# Patient Record
Sex: FEMALE
[De-identification: names, ages, dates, MRNs, and addresses within clinical notes are randomized per-mention and may not be internally consistent; named-entity substitution may affect disease eponyms.]

---

## 2017-09-18 ENCOUNTER — RESULT REVIEW (OUTPATIENT)
Age: 47
End: 2017-09-18

## 2018-08-23 ENCOUNTER — TRANSCRIPTION ENCOUNTER (OUTPATIENT)
Age: 48
End: 2018-08-23

## 2018-10-08 ENCOUNTER — APPOINTMENT (OUTPATIENT)
Dept: PEDIATRIC ALLERGY IMMUNOLOGY | Facility: CLINIC | Age: 48
End: 2018-10-08

## 2018-12-07 ENCOUNTER — APPOINTMENT (OUTPATIENT)
Dept: INTERNAL MEDICINE | Facility: CLINIC | Age: 48
End: 2018-12-07
Payer: COMMERCIAL

## 2018-12-07 VITALS
DIASTOLIC BLOOD PRESSURE: 60 MMHG | BODY MASS INDEX: 19.5 KG/M2 | OXYGEN SATURATION: 99 % | TEMPERATURE: 97.7 F | SYSTOLIC BLOOD PRESSURE: 94 MMHG | HEIGHT: 63 IN | WEIGHT: 110.03 LBS | HEART RATE: 65 BPM

## 2018-12-07 DIAGNOSIS — Z80.7 FAMILY HISTORY OF OTHER MALIGNANT NEOPLASMS OF LYMPHOID, HEMATOPOIETIC AND RELATED TISSUES: ICD-10-CM

## 2018-12-07 DIAGNOSIS — Z87.11 PERSONAL HISTORY OF PEPTIC ULCER DISEASE: ICD-10-CM

## 2018-12-07 DIAGNOSIS — M25.561 PAIN IN RIGHT KNEE: ICD-10-CM

## 2018-12-07 DIAGNOSIS — Z80.3 FAMILY HISTORY OF MALIGNANT NEOPLASM OF BREAST: ICD-10-CM

## 2018-12-07 PROCEDURE — 99203 OFFICE O/P NEW LOW 30 MIN: CPT | Mod: 25

## 2018-12-07 PROCEDURE — 36415 COLL VENOUS BLD VENIPUNCTURE: CPT

## 2018-12-07 NOTE — HISTORY OF PRESENT ILLNESS
[FreeTextEntry1] : cpe [de-identified] :  here for initial visit. lives with . Son lives in [place] age 22 works in [employer]. \par very active does wt training, gym daily. wears glasses\par sees dentist. \par  feet and joints doing fine.\par + menopausal sx. hot flashes. tried otc medication not helpful now stopped. sees gyn annually. utd mammo, colo.\par remote hx peptic ulcer. had colo nl 2011. declines vaccines. \par

## 2018-12-07 NOTE — REVIEW OF SYSTEMS
[Fever] : no fever [Vision Problems] : no vision problems [Hearing Loss] : no hearing loss [Chest Pain] : no chest pain [Lower Ext Edema] : no lower extremity edema [Shortness Of Breath] : no shortness of breath [Dyspnea on Exertion] : no dyspnea on exertion [Abdominal Pain] : no abdominal pain [Dysuria] : no dysuria [Joint Pain] : no joint pain [Skin Rash] : no skin rash [Headache] : no headache [Dizziness] : no dizziness [Depression] : no depression

## 2018-12-07 NOTE — PHYSICAL EXAM
[No Acute Distress] : no acute distress [Well Nourished] : well nourished [Normal Sclera/Conjunctiva] : normal sclera/conjunctiva [PERRL] : pupils equal round and reactive to light [EOMI] : extraocular movements intact [Normal Outer Ear/Nose] : the outer ears and nose were normal in appearance [Normal Oropharynx] : the oropharynx was normal [No JVD] : no jugular venous distention [Supple] : supple [No Lymphadenopathy] : no lymphadenopathy [Thyroid Normal, No Nodules] : the thyroid was normal and there were no nodules present [No Respiratory Distress] : no respiratory distress  [No Accessory Muscle Use] : no accessory muscle use [Normal Rate] : normal rate  [Regular Rhythm] : with a regular rhythm [Normal S1, S2] : normal S1 and S2 [No Murmur] : no murmur heard [No Edema] : there was no peripheral edema [Declined Breast Exam] : declined breast exam  [Soft] : abdomen soft [Non Tender] : non-tender [No HSM] : no HSM [Normal Bowel Sounds] : normal bowel sounds [Normal Posterior Cervical Nodes] : no posterior cervical lymphadenopathy [Normal Anterior Cervical Nodes] : no anterior cervical lymphadenopathy [No CVA Tenderness] : no CVA  tenderness [No Spinal Tenderness] : no spinal tenderness [No Joint Swelling] : no joint swelling [No Rash] : no rash [Normal Gait] : normal gait [Coordination Grossly Intact] : coordination grossly intact [No Focal Deficits] : no focal deficits [Normal Affect] : the affect was normal [Normal Mood] : the mood was normal [Normal Insight/Judgement] : insight and judgment were intact

## 2018-12-07 NOTE — ASSESSMENT
[FreeTextEntry1] : Menopausal sx - hot flashes only. given anxiety as trigger discussed relaxation, cbt. discused med options, pt declined. call if any worsening sx. \par HM utd keanu, gyn, colo. declined vaccines. utd ophtho, dental. mood ok. check bloodwork. fu as needed.

## 2018-12-10 LAB
BASOPHILS # BLD AUTO: 0.03 K/UL
BASOPHILS NFR BLD AUTO: 0.6 %
CHOLEST SERPL-MCNC: 163 MG/DL
CHOLEST/HDLC SERPL: 2.8 RATIO
EOSINOPHIL # BLD AUTO: 0.09 K/UL
EOSINOPHIL NFR BLD AUTO: 1.8 %
HCT VFR BLD CALC: 41.1 %
HDLC SERPL-MCNC: 58 MG/DL
HGB BLD-MCNC: 13.6 G/DL
IMM GRANULOCYTES NFR BLD AUTO: 0.4 %
LDLC SERPL CALC-MCNC: 80 MG/DL
LYMPHOCYTES # BLD AUTO: 0.86 K/UL
LYMPHOCYTES NFR BLD AUTO: 17 %
MAN DIFF?: NORMAL
MCHC RBC-ENTMCNC: 28.9 PG
MCHC RBC-ENTMCNC: 33.1 GM/DL
MCV RBC AUTO: 87.3 FL
MONOCYTES # BLD AUTO: 0.31 K/UL
MONOCYTES NFR BLD AUTO: 6.1 %
NEUTROPHILS # BLD AUTO: 3.76 K/UL
NEUTROPHILS NFR BLD AUTO: 74.1 %
PLATELET # BLD AUTO: 252 K/UL
RBC # BLD: 4.71 M/UL
RBC # FLD: 12.9 %
TRIGL SERPL-MCNC: 126 MG/DL
TSH SERPL-ACNC: 2.47 UIU/ML
VIT B12 SERPL-MCNC: 459 PG/ML
WBC # FLD AUTO: 5.07 K/UL

## 2019-03-16 ENCOUNTER — TRANSCRIPTION ENCOUNTER (OUTPATIENT)
Age: 49
End: 2019-03-16

## 2019-12-09 ENCOUNTER — APPOINTMENT (OUTPATIENT)
Dept: INTERNAL MEDICINE | Facility: CLINIC | Age: 49
End: 2019-12-09
Payer: COMMERCIAL

## 2019-12-09 VITALS
TEMPERATURE: 97.6 F | DIASTOLIC BLOOD PRESSURE: 60 MMHG | BODY MASS INDEX: 19.84 KG/M2 | HEIGHT: 63 IN | SYSTOLIC BLOOD PRESSURE: 94 MMHG | WEIGHT: 112 LBS | HEART RATE: 53 BPM | OXYGEN SATURATION: 98 %

## 2019-12-09 PROCEDURE — 99396 PREV VISIT EST AGE 40-64: CPT | Mod: 25

## 2019-12-09 PROCEDURE — 36415 COLL VENOUS BLD VENIPUNCTURE: CPT

## 2019-12-09 RX ORDER — GLUC/MSM/COLGN2/HYAL/ANTIARTH3 375-375-20
TABLET ORAL
Refills: 0 | Status: DISCONTINUED | COMMUNITY
End: 2019-12-09

## 2019-12-09 RX ORDER — CHROMIUM 200 MCG
TABLET ORAL
Refills: 0 | Status: DISCONTINUED | COMMUNITY
End: 2019-12-09

## 2019-12-09 NOTE — PHYSICAL EXAM
[No Acute Distress] : no acute distress [Well Developed] : well developed [Well Nourished] : well nourished [Well-Appearing] : well-appearing [PERRL] : pupils equal round and reactive to light [Normal Sclera/Conjunctiva] : normal sclera/conjunctiva [EOMI] : extraocular movements intact [Normal Outer Ear/Nose] : the outer ears and nose were normal in appearance [Normal Oropharynx] : the oropharynx was normal [No JVD] : no jugular venous distention [No Lymphadenopathy] : no lymphadenopathy [Supple] : supple [Thyroid Normal, No Nodules] : the thyroid was normal and there were no nodules present [No Respiratory Distress] : no respiratory distress  [No Accessory Muscle Use] : no accessory muscle use [Normal Rate] : normal rate  [Clear to Auscultation] : lungs were clear to auscultation bilaterally [Regular Rhythm] : with a regular rhythm [Normal S1, S2] : normal S1 and S2 [No Carotid Bruits] : no carotid bruits [No Murmur] : no murmur heard [No Abdominal Bruit] : a ~M bruit was not heard ~T in the abdomen [No Varicosities] : no varicosities [Pedal Pulses Present] : the pedal pulses are present [No Edema] : there was no peripheral edema [No Palpable Aorta] : no palpable aorta [No Extremity Clubbing/Cyanosis] : no extremity clubbing/cyanosis [Soft] : abdomen soft [Non-distended] : non-distended [Non Tender] : non-tender [No Masses] : no abdominal mass palpated [No HSM] : no HSM [Normal Bowel Sounds] : normal bowel sounds [Normal Supraclavicular Nodes] : no supraclavicular lymphadenopathy [Normal Posterior Cervical Nodes] : no posterior cervical lymphadenopathy [Normal Anterior Cervical Nodes] : no anterior cervical lymphadenopathy [No CVA Tenderness] : no CVA  tenderness [No Spinal Tenderness] : no spinal tenderness [No Joint Swelling] : no joint swelling [Grossly Normal Strength/Tone] : grossly normal strength/tone [No Rash] : no rash [Coordination Grossly Intact] : coordination grossly intact [No Focal Deficits] : no focal deficits [Normal Gait] : normal gait [Deep Tendon Reflexes (DTR)] : deep tendon reflexes were 2+ and symmetric [Normal Affect] : the affect was normal [Normal Mood] : the mood was normal [Normal Insight/Judgement] : insight and judgment were intact [de-identified] : papule on R tragus

## 2019-12-09 NOTE — ASSESSMENT
[FreeTextEntry1] : anxiety discussed options pt sees thearpist declined medications will call if worsening sx \par cont exercise, healthy diet \par sees dentist. sees eye doctor. utd colo, mammo, pap. \par menopausal sx improved, sees gyn annually. \par remote hx peptic ulcer no gerd sx since then. \par had colo nl 2011. declines vaccines. \par labs today\par fu 1y, sooner if any new sx. \par ear papule likely comedone if not resolving pt will fu derm.

## 2019-12-09 NOTE — HISTORY OF PRESENT ILLNESS
[de-identified] :  here for cpe lives with . Son lives in [place] age 22 works in fashion she does not speak to him. this is a source of stress and anxiety for her. impacts sleep. does not interfere with work or activities. no si hi. \par very active does wt training, gym daily no cp sob edema. wears glasses\par sees dentist. sees eye doctor. utd colo, mammo, pap. healthy vegetarian diet. \par  feet and joints doing fine.\par menopausal sx improved, sees gyn annually. \par remote hx peptic ulcer no gerd sx since then. had colo nl 2011. declines vaccines. \par 10 point review of systems reviewed and negative except as above  [FreeTextEntry1] : cpe

## 2019-12-10 LAB
ALBUMIN SERPL ELPH-MCNC: 4.6 G/DL
ALP BLD-CCNC: 61 U/L
ALT SERPL-CCNC: 11 U/L
ANION GAP SERPL CALC-SCNC: 14 MMOL/L
AST SERPL-CCNC: 15 U/L
BASOPHILS # BLD AUTO: 0.06 K/UL
BASOPHILS NFR BLD AUTO: 1 %
BILIRUB SERPL-MCNC: 0.4 MG/DL
BUN SERPL-MCNC: 13 MG/DL
CALCIUM SERPL-MCNC: 9.9 MG/DL
CHLORIDE SERPL-SCNC: 104 MMOL/L
CO2 SERPL-SCNC: 24 MMOL/L
CREAT SERPL-MCNC: 0.71 MG/DL
EOSINOPHIL # BLD AUTO: 0.11 K/UL
EOSINOPHIL NFR BLD AUTO: 1.8 %
GLUCOSE SERPL-MCNC: 86 MG/DL
HCT VFR BLD CALC: 43.7 %
HGB BLD-MCNC: 14.2 G/DL
IMM GRANULOCYTES NFR BLD AUTO: 0.5 %
LYMPHOCYTES # BLD AUTO: 1.03 K/UL
LYMPHOCYTES NFR BLD AUTO: 16.9 %
MAN DIFF?: NORMAL
MCHC RBC-ENTMCNC: 28.1 PG
MCHC RBC-ENTMCNC: 32.5 GM/DL
MCV RBC AUTO: 86.5 FL
MONOCYTES # BLD AUTO: 0.61 K/UL
MONOCYTES NFR BLD AUTO: 10 %
NEUTROPHILS # BLD AUTO: 4.26 K/UL
NEUTROPHILS NFR BLD AUTO: 69.8 %
PLATELET # BLD AUTO: 215 K/UL
POTASSIUM SERPL-SCNC: 4.5 MMOL/L
PROT SERPL-MCNC: 7.4 G/DL
RBC # BLD: 5.05 M/UL
RBC # FLD: 12.6 %
SODIUM SERPL-SCNC: 141 MMOL/L
WBC # FLD AUTO: 6.1 K/UL

## 2020-10-05 ENCOUNTER — RESULT REVIEW (OUTPATIENT)
Age: 50
End: 2020-10-05

## 2021-02-05 ENCOUNTER — APPOINTMENT (OUTPATIENT)
Dept: INTERNAL MEDICINE | Facility: CLINIC | Age: 51
End: 2021-02-05
Payer: COMMERCIAL

## 2021-02-05 VITALS
BODY MASS INDEX: 20.02 KG/M2 | OXYGEN SATURATION: 98 % | DIASTOLIC BLOOD PRESSURE: 60 MMHG | TEMPERATURE: 97.8 F | HEIGHT: 63 IN | WEIGHT: 113 LBS | SYSTOLIC BLOOD PRESSURE: 100 MMHG | HEART RATE: 68 BPM

## 2021-02-05 DIAGNOSIS — Z23 ENCOUNTER FOR IMMUNIZATION: ICD-10-CM

## 2021-02-05 PROCEDURE — 99072 ADDL SUPL MATRL&STAF TM PHE: CPT

## 2021-02-05 PROCEDURE — 99396 PREV VISIT EST AGE 40-64: CPT | Mod: 25

## 2021-02-05 NOTE — PHYSICAL EXAM
[No Acute Distress] : no acute distress [Well Nourished] : well nourished [Well Developed] : well developed [Well-Appearing] : well-appearing [Normal Sclera/Conjunctiva] : normal sclera/conjunctiva [PERRL] : pupils equal round and reactive to light [Normal Outer Ear/Nose] : the outer ears and nose were normal in appearance [EOMI] : extraocular movements intact [Normal Oropharynx] : the oropharynx was normal [No JVD] : no jugular venous distention [No Lymphadenopathy] : no lymphadenopathy [Supple] : supple [Thyroid Normal, No Nodules] : the thyroid was normal and there were no nodules present [No Respiratory Distress] : no respiratory distress  [No Accessory Muscle Use] : no accessory muscle use [Clear to Auscultation] : lungs were clear to auscultation bilaterally [Normal Rate] : normal rate  [Regular Rhythm] : with a regular rhythm [Normal S1, S2] : normal S1 and S2 [No Murmur] : no murmur heard [No Carotid Bruits] : no carotid bruits [No Abdominal Bruit] : a ~M bruit was not heard ~T in the abdomen [No Varicosities] : no varicosities [Pedal Pulses Present] : the pedal pulses are present [No Edema] : there was no peripheral edema [No Palpable Aorta] : no palpable aorta [No Extremity Clubbing/Cyanosis] : no extremity clubbing/cyanosis [Declined Breast Exam] : declined breast exam  [Soft] : abdomen soft [Non Tender] : non-tender [Non-distended] : non-distended [No Masses] : no abdominal mass palpated [No HSM] : no HSM [Normal Bowel Sounds] : normal bowel sounds [Normal Posterior Cervical Nodes] : no posterior cervical lymphadenopathy [Normal Anterior Cervical Nodes] : no anterior cervical lymphadenopathy [No CVA Tenderness] : no CVA  tenderness [No Spinal Tenderness] : no spinal tenderness [No Joint Swelling] : no joint swelling [Grossly Normal Strength/Tone] : grossly normal strength/tone [No Rash] : no rash [Coordination Grossly Intact] : coordination grossly intact [No Focal Deficits] : no focal deficits [Normal Gait] : normal gait [Deep Tendon Reflexes (DTR)] : deep tendon reflexes were 2+ and symmetric [Normal Affect] : the affect was normal [Normal Insight/Judgement] : insight and judgment were intact

## 2021-02-05 NOTE — HISTORY OF PRESENT ILLNESS
[de-identified] :  here for cpe lives with . moods doing well. \par very active does walks daily 2mi no cp sob edema. wears glasses\par sees dentist. sees eye doctor. utd colo, mammo, pap. healthy vegetarian diet. \par menopausal sx improved, sees gyn annually. \par remote hx peptic ulcer no gerd sx since then. had colo nl 2011 due for f/u this year. \par 10 point review of systems reviewed and negative except as above

## 2021-02-05 NOTE — ASSESSMENT
[FreeTextEntry1] :  moods doing well\par discussed exercise\par rec colonoscopy\par utd mammo, pap. cont healthy diet, utd drem, dental. \par remote hx peptic ulcer due for gi fu which she declined will do fobi and return to gi next year.

## 2021-02-07 LAB
25(OH)D3 SERPL-MCNC: 34.4 NG/ML
ALBUMIN SERPL ELPH-MCNC: 4.9 G/DL
ALP BLD-CCNC: 69 U/L
ALT SERPL-CCNC: 12 U/L
ANION GAP SERPL CALC-SCNC: 20 MMOL/L
AST SERPL-CCNC: 20 U/L
BASOPHILS # BLD AUTO: 0.05 K/UL
BASOPHILS NFR BLD AUTO: 0.8 %
BILIRUB SERPL-MCNC: 0.3 MG/DL
BUN SERPL-MCNC: 16 MG/DL
CALCIUM SERPL-MCNC: 10 MG/DL
CHLORIDE SERPL-SCNC: 104 MMOL/L
CHOLEST SERPL-MCNC: 168 MG/DL
CO2 SERPL-SCNC: 19 MMOL/L
CREAT SERPL-MCNC: 0.82 MG/DL
EOSINOPHIL # BLD AUTO: 0.06 K/UL
EOSINOPHIL NFR BLD AUTO: 1 %
GLUCOSE SERPL-MCNC: 95 MG/DL
HCT VFR BLD CALC: 42.4 %
HDLC SERPL-MCNC: 55 MG/DL
HGB BLD-MCNC: 14.3 G/DL
IMM GRANULOCYTES NFR BLD AUTO: 0.2 %
LDLC SERPL CALC-MCNC: 86 MG/DL
LYMPHOCYTES # BLD AUTO: 0.78 K/UL
LYMPHOCYTES NFR BLD AUTO: 12.8 %
MAN DIFF?: NORMAL
MCHC RBC-ENTMCNC: 29.4 PG
MCHC RBC-ENTMCNC: 33.7 GM/DL
MCV RBC AUTO: 87.1 FL
MONOCYTES # BLD AUTO: 0.48 K/UL
MONOCYTES NFR BLD AUTO: 7.9 %
NEUTROPHILS # BLD AUTO: 4.72 K/UL
NEUTROPHILS NFR BLD AUTO: 77.3 %
NONHDLC SERPL-MCNC: 113 MG/DL
PLATELET # BLD AUTO: 232 K/UL
POTASSIUM SERPL-SCNC: 5 MMOL/L
PROT SERPL-MCNC: 7.7 G/DL
RBC # BLD: 4.87 M/UL
RBC # FLD: 12.6 %
SODIUM SERPL-SCNC: 143 MMOL/L
TRIGL SERPL-MCNC: 136 MG/DL
TSH SERPL-ACNC: 3.47 UIU/ML
VIT B12 SERPL-MCNC: 397 PG/ML
WBC # FLD AUTO: 6.1 K/UL

## 2021-02-16 ENCOUNTER — NON-APPOINTMENT (OUTPATIENT)
Age: 51
End: 2021-02-16

## 2021-02-16 LAB — HEMOCCULT STL QL IA: NEGATIVE

## 2021-10-17 ENCOUNTER — NON-APPOINTMENT (OUTPATIENT)
Age: 51
End: 2021-10-17

## 2021-10-21 ENCOUNTER — APPOINTMENT (OUTPATIENT)
Dept: ORTHOPEDIC SURGERY | Facility: CLINIC | Age: 51
End: 2021-10-21
Payer: COMMERCIAL

## 2021-10-21 VITALS — WEIGHT: 120 LBS | BODY MASS INDEX: 21.26 KG/M2 | HEIGHT: 63 IN

## 2021-10-21 VITALS — SYSTOLIC BLOOD PRESSURE: 100 MMHG | DIASTOLIC BLOOD PRESSURE: 69 MMHG | HEART RATE: 71 BPM

## 2021-10-21 PROCEDURE — 73564 X-RAY EXAM KNEE 4 OR MORE: CPT | Mod: RT

## 2021-10-21 PROCEDURE — 99203 OFFICE O/P NEW LOW 30 MIN: CPT

## 2021-10-26 NOTE — HISTORY OF PRESENT ILLNESS
[de-identified] : 50 year old female with prior history of right knee pain presents today with right knee swelling x 1 week. Notices swelling after playing tennis. She is not taking pain medication. Denies catching, locking, numbness or tingling. She has had similar pain in 2016 and saw Dr. Mcgowan and was treated conservatively and the pain resolved. \par \par The patient's past medical history, past surgical history, medications and allergies were reviewed by me today with the patient and documented accordingly. In addition, the patient's family and social history, which were noncontributory to this visit, were reviewed also.

## 2021-10-26 NOTE — PHYSICAL EXAM
[de-identified] : Oriented to time, place, person\par Mood: Normal\par Affect: Normal\par Appearance: Healthy, well appearing, no acute distress.\par Gait: Normal\par Assistive Devices: None\par \par Right Knee Exam:\par \par Skin: Clean, dry, intact\par Inspection: No obvious malalignment, no masses, no swelling, mild effusion\par Pulses: 2+ DP/PT pulses \par ROM: 0-135 degrees of flexion. No pain with deep knee flexion/extension.\par Tenderness: No MJLT. +LJLT. No pain over the patella facets. No pain to the quadriceps tendon. No pain to the patella tendon. No posterior knee tenderness.\par Stability: Stable to varus, valgus. Negative Lachman testing. Negative anterior drawer, negative posterior drawer.\par Strength: 5/5 Q/H/TA/GS/EHL, without atrophy\par Neuro: Intact to light touch throughout, DTRs normal\par Additional Tests: Negative Devin's test, Negative patellar grind test  [de-identified] : The following radiographs were ordered and read by me during this patients visit. I reviewed each radiograph in detail with the patient and discussed the findings as highlighted below. \par \par 4 views of the right knee were obtained today, 10/21/2021, that show no acute fracture or dislocation. There is no medial, no lateral and mild patellofemoral degenerative changes seen. There is no significant malalignment. No significant other obvious osseous abnormality, otherwise unremarkable.  [facility]

## 2021-10-26 NOTE — ADDENDUM
[FreeTextEntry1] : This note was written by Lily Espinal on 10/21/2021 acting solely as a scribe for Dr. Jesus Sanders.\par \par All medical record entries made by the Scribe were at my, Dr. Jesus Sanders, direction and personally dictated by me on 10/21/2021. I have personally reviewed the chart and agree that the record accurately reflects my personal performance of the history, physical exam, assessment and plan.

## 2021-10-26 NOTE — DISCUSSION/SUMMARY
[de-identified] : 51 y/o female with right knee pain. \par \par Presentation is consistent with early degenerative change and arthrosis to the knee. The pain is focused on lateral joint line tenderness with possible meniscal pathology.  Described that this is likely due to overuse, and age-related "wear and tear". Pain may be related to breakdown of the chondral surfaces, cartilage, or ligaments of the knee. Patient is not looking for surgical intervention and does not want to get MRI at this time. \par \par Recommendation: Begin trial of PT, Rx given. Conservative care & observation; including rest/activity avoidance until less symptomatic with subsequent gradual return to full low impact activity as tolerated. Patient may also use OTC NSAIDs or acetaminophen as tolerated, with application of ice/heat to the area 2-3x daily for 20 minutes after periods of activity. \par \par Follow up as needed if pain persists for further evaluation and treatment.

## 2021-11-16 ENCOUNTER — NON-APPOINTMENT (OUTPATIENT)
Age: 51
End: 2021-11-16

## 2021-11-20 ENCOUNTER — APPOINTMENT (OUTPATIENT)
Dept: AFTER HOURS CARE | Facility: EMERGENCY ROOM | Age: 51
End: 2021-11-20
Payer: COMMERCIAL

## 2021-11-21 ENCOUNTER — TRANSCRIPTION ENCOUNTER (OUTPATIENT)
Age: 51
End: 2021-11-21

## 2021-11-21 PROCEDURE — 99203 OFFICE O/P NEW LOW 30 MIN: CPT | Mod: 95

## 2021-11-21 NOTE — PLAN
[No new medications perscribed] : Treat in place: No new medications prescribed [FreeTextEntry1] : pt can add tylenol to meds already taken\par pt referred back to her orthopedist to escalate her pain meds\par ED for uncontrolled pain

## 2021-11-21 NOTE — HISTORY OF PRESENT ILLNESS
[Home] : at home, [unfilled] , at the time of the visit. [Other Location: e.g. Home (Enter Location, City,State)___] : at [unfilled] [Verbal consent obtained from patient] : the patient, [unfilled] [FreeTextEntry8] : 50F recently had R meniscus injury (nonoperative for now) and started PT. Pt had 2nd PT 2d ago and shortly thereafter has had worsening R knee and low back pain. No motor/sensory changes, trouble urinating, LE swelling, rashes, fever. Pt was told to take Aleve and motrin by her orthopedist earlier tonight but has not had any relief. Pt requesting something more for her pain

## 2022-02-10 ENCOUNTER — NON-APPOINTMENT (OUTPATIENT)
Age: 52
End: 2022-02-10

## 2022-02-11 ENCOUNTER — APPOINTMENT (OUTPATIENT)
Dept: INTERNAL MEDICINE | Facility: CLINIC | Age: 52
End: 2022-02-11

## 2022-02-14 ENCOUNTER — APPOINTMENT (OUTPATIENT)
Dept: INTERNAL MEDICINE | Facility: CLINIC | Age: 52
End: 2022-02-14
Payer: COMMERCIAL

## 2022-02-14 VITALS
BODY MASS INDEX: 20.02 KG/M2 | OXYGEN SATURATION: 97 % | HEIGHT: 63 IN | TEMPERATURE: 97.7 F | SYSTOLIC BLOOD PRESSURE: 112 MMHG | HEART RATE: 57 BPM | DIASTOLIC BLOOD PRESSURE: 74 MMHG | WEIGHT: 113 LBS

## 2022-02-14 DIAGNOSIS — N95.1 MENOPAUSAL AND FEMALE CLIMACTERIC STATES: ICD-10-CM

## 2022-02-14 PROCEDURE — 99396 PREV VISIT EST AGE 40-64: CPT | Mod: 25

## 2022-02-14 NOTE — HISTORY OF PRESENT ILLNESS
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 moods doing well. \par very active does walks daily 2mi no cp sob edema. wears glasses\par meniscal tear and sciatica did pt which helped. \par sees dentist. sees eye doctor. utd fobi, mammo/sono, pap. healthy vegetarian diet. dr wood bone density due this or next year. osteopenia doing wbe and ca/d. \par menopausal sx improved, sees gyn annually. \par remote gerd no ulcer no sx since then. had colo nl 2011 fobi 2021 nl would prefer to do this year again. \par

## 2022-02-14 NOTE — ASSESSMENT
[FreeTextEntry1] : cont exercise.\par joint injuries recovered \par utd derm ophtho dental \par utd covid vaccine declines other vaccines\par  osteopenia doing wbe and ca/d. rpt bmd this or next year\par menopausal sx improved, sees gyn annually. utd pap\par had colo nl 2011 fobi 2021 nl would prefer to do this year again.

## 2022-02-15 LAB
25(OH)D3 SERPL-MCNC: 34.3 NG/ML
ALBUMIN SERPL ELPH-MCNC: 4.7 G/DL
ALP BLD-CCNC: 68 U/L
ALT SERPL-CCNC: 16 U/L
ANION GAP SERPL CALC-SCNC: 17 MMOL/L
AST SERPL-CCNC: 18 U/L
BASOPHILS # BLD AUTO: 0.06 K/UL
BASOPHILS NFR BLD AUTO: 1.1 %
BILIRUB SERPL-MCNC: 0.4 MG/DL
BUN SERPL-MCNC: 13 MG/DL
CALCIUM SERPL-MCNC: 10.3 MG/DL
CHLORIDE SERPL-SCNC: 105 MMOL/L
CO2 SERPL-SCNC: 22 MMOL/L
CREAT SERPL-MCNC: 0.73 MG/DL
EOSINOPHIL # BLD AUTO: 0.11 K/UL
EOSINOPHIL NFR BLD AUTO: 2 %
GLUCOSE SERPL-MCNC: 97 MG/DL
HCT VFR BLD CALC: 40.4 %
HGB BLD-MCNC: 13.4 G/DL
IMM GRANULOCYTES NFR BLD AUTO: 0.2 %
LYMPHOCYTES # BLD AUTO: 0.93 K/UL
LYMPHOCYTES NFR BLD AUTO: 17.1 %
MAN DIFF?: NORMAL
MCHC RBC-ENTMCNC: 29.5 PG
MCHC RBC-ENTMCNC: 33.2 GM/DL
MCV RBC AUTO: 89 FL
MONOCYTES # BLD AUTO: 0.58 K/UL
MONOCYTES NFR BLD AUTO: 10.6 %
NEUTROPHILS # BLD AUTO: 3.76 K/UL
NEUTROPHILS NFR BLD AUTO: 69 %
PLATELET # BLD AUTO: 253 K/UL
POTASSIUM SERPL-SCNC: 4.9 MMOL/L
PROT SERPL-MCNC: 7.2 G/DL
RBC # BLD: 4.54 M/UL
RBC # FLD: 12.7 %
SODIUM SERPL-SCNC: 143 MMOL/L
VIT B12 SERPL-MCNC: 451 PG/ML
WBC # FLD AUTO: 5.45 K/UL

## 2022-02-18 ENCOUNTER — NON-APPOINTMENT (OUTPATIENT)
Age: 52
End: 2022-02-18

## 2022-02-19 LAB — HEMOCCULT STL QL IA: NEGATIVE

## 2022-03-29 ENCOUNTER — APPOINTMENT (OUTPATIENT)
Dept: ORTHOPEDIC SURGERY | Facility: CLINIC | Age: 52
End: 2022-03-29
Payer: COMMERCIAL

## 2022-03-29 PROCEDURE — 99213 OFFICE O/P EST LOW 20 MIN: CPT

## 2022-03-29 NOTE — PHYSICAL EXAM
[de-identified] : Oriented to time, place, person\par Mood: Normal\par Affect: Normal\par Appearance: Healthy, well appearing, no acute distress.\par Gait: Normal\par Assistive Devices: None\par \par Right Knee Exam:\par \par Skin: Clean, dry, intact\par Inspection: No obvious malalignment, no masses, no swelling, mild effusion\par Pulses: 2+ DP/PT pulses \par ROM: 0-135 degrees of flexion. No pain with deep knee flexion/extension.\par Tenderness: No MJLT. +LJLT. No pain over the patella facets. No pain to the quadriceps tendon. No pain to the patella tendon. No posterior knee tenderness.\par Stability: Stable to varus, valgus. Negative Lachman testing. Negative anterior drawer, negative posterior drawer.\par Strength: 5/5 Q/H/TA/GS/EHL, without atrophy\par Neuro: Intact to light touch throughout, DTRs normal\par Additional Tests: Negative Devin's test, Negative patellar grind test  [de-identified] : 4 views of the right knee were obtained 10/21/2021, that show no acute fracture or dislocation. There is no medial, no lateral and mild patellofemoral degenerative changes seen. There is no significant malalignment. No significant other obvious osseous abnormality, otherwise unremarkable.

## 2022-03-29 NOTE — ADDENDUM
[FreeTextEntry1] : This note was written by Lily Espinal on 03/29/2022 acting solely as a scribe for Dr. Jesus Sanders.\par \par All medical record entries made by the Scribe were at my, Dr. Jesus Sanders, direction and personally dictated by me on 03/29/2022. I have personally reviewed the chart and agree that the record accurately reflects my personal performance of the history, physical exam, assessment and plan.

## 2022-03-29 NOTE — HISTORY OF PRESENT ILLNESS
[de-identified] : 51 year old female with prior history of right knee pain presents today with right knee swelling x 10 days. States that she was doing well until 10 days ago playing tennis she came down from a  serve and felt a tear in her knee. She noticed swelling the following day. There was now pain associated with the incident. She would like to return to playing tennis but wants to know if she can. She is afraid of damaging her knee. Denies buckling, catching, numbness or tingling. PT was helpful in the past.

## 2022-03-29 NOTE — DISCUSSION/SUMMARY
[de-identified] : 50 y/o female with right knee pain. \par \par Presents with return of right knee pain aggravated by playing tennis. The pain is focused on lateral joint line tenderness with possible degenerative meniscal pathology as well as some continued right peripatellar pain. Patient is looking for an MRI for further evaluation of internal derangement, and I discussed that she likely has a degenerative process which is recently aggravated with high-impact exercise.  We discussed concern for internal derangement as will likely be seen on MRI imaging, patient voiced understanding, we discussed that if symptoms were to progress then MRI would be warranted to further evaluate for high-grade internal structural derangement.. \par \par Recommendations: Continue conservative care & observation, this includes rest/activity avoidance until less symptomatic with subsequent gradual return to full activity as tolerated during periods of exacerbation. Patient may also use OTC NSAID's or acetaminophen as tolerated, with application of ice to the area 2-3x daily for 20 minutes after periods of activity. \par \par Follow-up after MRI.

## 2022-07-11 ENCOUNTER — NON-APPOINTMENT (OUTPATIENT)
Age: 52
End: 2022-07-11

## 2022-10-24 ENCOUNTER — RESULT REVIEW (OUTPATIENT)
Age: 52
End: 2022-10-24

## 2022-11-20 ENCOUNTER — NON-APPOINTMENT (OUTPATIENT)
Age: 52
End: 2022-11-20

## 2022-12-06 ENCOUNTER — APPOINTMENT (OUTPATIENT)
Dept: ORTHOPEDIC SURGERY | Facility: CLINIC | Age: 52
End: 2022-12-06

## 2023-02-17 ENCOUNTER — APPOINTMENT (OUTPATIENT)
Dept: INTERNAL MEDICINE | Facility: CLINIC | Age: 53
End: 2023-02-17
Payer: COMMERCIAL

## 2023-02-17 VITALS
TEMPERATURE: 97.5 F | DIASTOLIC BLOOD PRESSURE: 59 MMHG | SYSTOLIC BLOOD PRESSURE: 88 MMHG | OXYGEN SATURATION: 97 % | HEART RATE: 61 BPM | HEIGHT: 63 IN | WEIGHT: 113 LBS | BODY MASS INDEX: 20.02 KG/M2

## 2023-02-17 LAB
BASOPHILS # BLD AUTO: 0.05 K/UL
BASOPHILS NFR BLD AUTO: 1.2 %
EOSINOPHIL # BLD AUTO: 0.09 K/UL
EOSINOPHIL NFR BLD AUTO: 2.1 %
HCT VFR BLD CALC: 38.3 %
HGB BLD-MCNC: 12.9 G/DL
IMM GRANULOCYTES NFR BLD AUTO: 0.5 %
LYMPHOCYTES # BLD AUTO: 0.52 K/UL
LYMPHOCYTES NFR BLD AUTO: 12.2 %
MAN DIFF?: NORMAL
MCHC RBC-ENTMCNC: 28.9 PG
MCHC RBC-ENTMCNC: 33.7 GM/DL
MCV RBC AUTO: 85.9 FL
MONOCYTES # BLD AUTO: 0.43 K/UL
MONOCYTES NFR BLD AUTO: 10.1 %
NEUTROPHILS # BLD AUTO: 3.16 K/UL
NEUTROPHILS NFR BLD AUTO: 73.9 %
PLATELET # BLD AUTO: 218 K/UL
RBC # BLD: 4.46 M/UL
RBC # FLD: 13 %
WBC # FLD AUTO: 4.27 K/UL

## 2023-02-17 PROCEDURE — 99396 PREV VISIT EST AGE 40-64: CPT

## 2023-02-17 NOTE — HISTORY OF PRESENT ILLNESS
[de-identified] : R knee OA has done pt, doing accupuntcure, heat  declines surgery, mri, and injection. would like to see specialist about alignment. avoiding tennis.\par utd derm ophtho dental \par utd covid vaccine declines other vaccines\par  osteopenia takse medigenics ostera. did dexa this year \par pap + hpv returned to gyn colpo was negative. \par mammogram utd 2022 \par had colo nl 2011 fobi 2021 nl would prefer to do this year again. \par utd derm ophtho dental \par due for fobi. \par

## 2023-02-17 NOTE — PLAN
[FreeTextEntry1] : R knee OA has done pt, doing accupuntcure, rec suportive shoes, heat, stretching, and fu pm&r re: alignment\par utd derm ophtho dental \par utd covid vaccine declines other vaccines\par  osteopenia utd dexa discussed exercise, ca/d\par pap + hpv returned to gyn colpo was negative. cont gyn fu \par mammogram utd 2022 \par utd derm ophtho dental \par due for fobi. \par cont exercise\par

## 2023-02-17 NOTE — PHYSICAL EXAM
[No Acute Distress] : no acute distress [Well Nourished] : well nourished [Well Developed] : well developed [Well-Appearing] : well-appearing [Normal Sclera/Conjunctiva] : normal sclera/conjunctiva [PERRL] : pupils equal round and reactive to light [EOMI] : extraocular movements intact [Normal Outer Ear/Nose] : the outer ears and nose were normal in appearance [Normal Oropharynx] : the oropharynx was normal [No JVD] : no jugular venous distention [No Lymphadenopathy] : no lymphadenopathy [Supple] : supple [Thyroid Normal, No Nodules] : the thyroid was normal and there were no nodules present [No Respiratory Distress] : no respiratory distress  [No Accessory Muscle Use] : no accessory muscle use [Clear to Auscultation] : lungs were clear to auscultation bilaterally [Normal Rate] : normal rate  [Regular Rhythm] : with a regular rhythm [Normal S1, S2] : normal S1 and S2 [No Murmur] : no murmur heard [No Carotid Bruits] : no carotid bruits [No Abdominal Bruit] : a ~M bruit was not heard ~T in the abdomen [No Varicosities] : no varicosities [Pedal Pulses Present] : the pedal pulses are present [No Edema] : there was no peripheral edema [No Palpable Aorta] : no palpable aorta [No Extremity Clubbing/Cyanosis] : no extremity clubbing/cyanosis [Normal Appearance] : normal in appearance [No Nipple Discharge] : no nipple discharge [No Axillary Lymphadenopathy] : no axillary lymphadenopathy [Soft] : abdomen soft [Non Tender] : non-tender [Non-distended] : non-distended [No Masses] : no abdominal mass palpated [No HSM] : no HSM [Normal Bowel Sounds] : normal bowel sounds [Normal Posterior Cervical Nodes] : no posterior cervical lymphadenopathy [Normal Anterior Cervical Nodes] : no anterior cervical lymphadenopathy [No CVA Tenderness] : no CVA  tenderness [No Spinal Tenderness] : no spinal tenderness [No Joint Swelling] : no joint swelling [Grossly Normal Strength/Tone] : grossly normal strength/tone [No Rash] : no rash [Coordination Grossly Intact] : coordination grossly intact [No Focal Deficits] : no focal deficits [Normal Gait] : normal gait [Deep Tendon Reflexes (DTR)] : deep tendon reflexes were 2+ and symmetric [Normal Affect] : the affect was normal [Normal Insight/Judgement] : insight and judgment were intact [de-identified] : R knee no effusion nl rom no laxity neg paty + crepitus

## 2023-02-20 DIAGNOSIS — M85.80 OTHER SPECIFIED DISORDERS OF BONE DENSITY AND STRUCTURE, UNSPECIFIED SITE: ICD-10-CM

## 2023-02-20 LAB
ALBUMIN SERPL ELPH-MCNC: 4.5 G/DL
ALP BLD-CCNC: 57 U/L
ALT SERPL-CCNC: 16 U/L
ANION GAP SERPL CALC-SCNC: 10 MMOL/L
AST SERPL-CCNC: 16 U/L
BILIRUB SERPL-MCNC: 0.3 MG/DL
BUN SERPL-MCNC: 13 MG/DL
CALCIUM SERPL-MCNC: 10 MG/DL
CHLORIDE SERPL-SCNC: 105 MMOL/L
CHOLEST SERPL-MCNC: 169 MG/DL
CO2 SERPL-SCNC: 25 MMOL/L
CREAT SERPL-MCNC: 0.81 MG/DL
EGFR: 87 ML/MIN/1.73M2
GLUCOSE SERPL-MCNC: 91 MG/DL
HDLC SERPL-MCNC: 60 MG/DL
LDLC SERPL CALC-MCNC: 92 MG/DL
NONHDLC SERPL-MCNC: 109 MG/DL
POTASSIUM SERPL-SCNC: 5.6 MMOL/L
PROT SERPL-MCNC: 7.3 G/DL
SODIUM SERPL-SCNC: 140 MMOL/L
TRIGL SERPL-MCNC: 85 MG/DL

## 2023-02-24 LAB — HEMOCCULT STL QL IA: NEGATIVE

## 2023-03-01 LAB
ANION GAP SERPL CALC-SCNC: 10 MMOL/L
BUN SERPL-MCNC: 15 MG/DL
CALCIUM SERPL-MCNC: 10.1 MG/DL
CHLORIDE SERPL-SCNC: 104 MMOL/L
CO2 SERPL-SCNC: 23 MMOL/L
CREAT SERPL-MCNC: 0.8 MG/DL
EGFR: 88 ML/MIN/1.73M2
GLUCOSE SERPL-MCNC: 77 MG/DL
POTASSIUM SERPL-SCNC: 5 MMOL/L
SODIUM SERPL-SCNC: 138 MMOL/L

## 2023-03-02 ENCOUNTER — TRANSCRIPTION ENCOUNTER (OUTPATIENT)
Age: 53
End: 2023-03-02

## 2023-03-03 ENCOUNTER — TRANSCRIPTION ENCOUNTER (OUTPATIENT)
Age: 53
End: 2023-03-03

## 2023-03-06 ENCOUNTER — APPOINTMENT (OUTPATIENT)
Dept: ORTHOPEDIC SURGERY | Facility: CLINIC | Age: 53
End: 2023-03-06
Payer: COMMERCIAL

## 2023-03-06 VITALS — WEIGHT: 113 LBS | HEIGHT: 63 IN | BODY MASS INDEX: 20.02 KG/M2

## 2023-03-06 PROCEDURE — 99213 OFFICE O/P EST LOW 20 MIN: CPT

## 2023-03-06 NOTE — DISCUSSION/SUMMARY
[de-identified] : 51 y/o female with right knee pain. \par \par Patient presents for further evaluation of her right knee.  We discussed that she has early signs of chondrosis within the patellofemoral joint, and we discussed that some of the lateral sided symptoms may be stemming from this underlying pathology.  Patient is looking for an MRI for further evaluation of internal derangement, and I discussed that she likely has a degenerative process which is aggravated with high-impact exercise.  \par \par Recommendations: Continue conservative care & observation, this includes rest/activity avoidance until less symptomatic, OTC NSAID's or acetaminophen as tolerated, with application of ice to the area 2-3x daily for 20 minutes after periods of activity.  MRI imaging right knee.\par \par Follow-up after MRI.

## 2023-03-06 NOTE — HISTORY OF PRESENT ILLNESS
[de-identified] : 52 year old female with prior history of right knee pain presents today for follow up of right knee swelling/pain. She was seen last March and referred for MRI but she did not have it done. Pain and swelling has been chronic. She stopped playing tennis since August due to pain and knee does not feel better.  She reports some stabbing pain through the anterior lateral joint.  Denies buckling, catching, numbness or tingling.

## 2023-03-06 NOTE — ADDENDUM
[FreeTextEntry1] : This note was written by Lily Espinal on 03/06/2023 acting solely as a scribe for Dr. Jesus Sanders.\par \par All medical record entries made by the Scribe were at my, Dr. Jesus Sanders, direction and personally dictated by me on 03/06/2023. I have personally reviewed the chart and agree that the record accurately reflects my personal performance of the history, physical exam, assessment and plan.

## 2023-03-06 NOTE — PHYSICAL EXAM
[de-identified] : Oriented to time, place, person\par Mood: Normal\par Affect: Normal\par Appearance: Healthy, well appearing, no acute distress.\par Gait: Normal\par Assistive Devices: None\par \par Right Knee Exam:\par \par Skin: Clean, dry, intact\par Inspection: No obvious malalignment, no masses, no swelling, no effusion\par Pulses: 2+ DP/PT pulses \par ROM: 0-135 degrees of flexion. No pain with deep knee flexion/extension.\par Tenderness: No MJLT. +LJLT. No pain over the patella facets. No pain to the quadriceps tendon. No pain to the patella tendon. No posterior knee tenderness.  No tenderness over the iliotibial band.\par Stability: Stable to varus, valgus. Negative Lachman testing. Negative anterior drawer, negative posterior drawer.\par Strength: 5/5 Q/H/TA/GS/EHL, without atrophy\par Neuro: Intact to light touch throughout, DTRs normal\par Additional Tests: Negative Devin's test, Negative patellar grind test  [de-identified] : 4 views of the right knee were obtained 10/21/2021, that show no acute fracture or dislocation. There is no medial, no lateral and mild patellofemoral degenerative changes seen. There is no significant malalignment. No significant other obvious osseous abnormality, otherwise unremarkable.

## 2023-03-10 ENCOUNTER — NON-APPOINTMENT (OUTPATIENT)
Age: 53
End: 2023-03-10

## 2023-03-10 ENCOUNTER — APPOINTMENT (OUTPATIENT)
Dept: ORTHOPEDIC SURGERY | Facility: CLINIC | Age: 53
End: 2023-03-10
Payer: COMMERCIAL

## 2023-03-10 DIAGNOSIS — M25.561 PAIN IN RIGHT KNEE: ICD-10-CM

## 2023-03-10 PROCEDURE — 99204 OFFICE O/P NEW MOD 45 MIN: CPT

## 2023-03-23 ENCOUNTER — OUTPATIENT (OUTPATIENT)
Dept: OUTPATIENT SERVICES | Facility: HOSPITAL | Age: 53
LOS: 1 days | End: 2023-03-23
Payer: MEDICAID

## 2023-03-23 ENCOUNTER — APPOINTMENT (OUTPATIENT)
Dept: MRI IMAGING | Facility: CLINIC | Age: 53
End: 2023-03-23
Payer: COMMERCIAL

## 2023-03-23 DIAGNOSIS — M22.41 CHONDROMALACIA PATELLAE, RIGHT KNEE: ICD-10-CM

## 2023-03-23 PROCEDURE — 73721 MRI JNT OF LWR EXTRE W/O DYE: CPT

## 2023-03-23 PROCEDURE — 73721 MRI JNT OF LWR EXTRE W/O DYE: CPT | Mod: 26,RT

## 2023-03-30 ENCOUNTER — NON-APPOINTMENT (OUTPATIENT)
Age: 53
End: 2023-03-30

## 2023-04-04 ENCOUNTER — NON-APPOINTMENT (OUTPATIENT)
Age: 53
End: 2023-04-04

## 2023-06-02 ENCOUNTER — APPOINTMENT (OUTPATIENT)
Dept: ORTHOPEDIC SURGERY | Facility: CLINIC | Age: 53
End: 2023-06-02
Payer: COMMERCIAL

## 2023-06-02 DIAGNOSIS — M22.41 CHONDROMALACIA PATELLAE, RIGHT KNEE: ICD-10-CM

## 2023-06-02 PROCEDURE — 99213 OFFICE O/P EST LOW 20 MIN: CPT

## 2023-06-03 PROBLEM — M22.41 PATELLOFEMORAL CHONDROSIS OF RIGHT KNEE: Status: ACTIVE | Noted: 2023-03-06

## 2023-09-21 ENCOUNTER — TRANSCRIPTION ENCOUNTER (OUTPATIENT)
Age: 53
End: 2023-09-21

## 2024-03-08 ENCOUNTER — APPOINTMENT (OUTPATIENT)
Dept: INTERNAL MEDICINE | Facility: CLINIC | Age: 54
End: 2024-03-08
Payer: COMMERCIAL

## 2024-03-08 VITALS
HEIGHT: 63 IN | WEIGHT: 113 LBS | OXYGEN SATURATION: 97 % | BODY MASS INDEX: 20.02 KG/M2 | TEMPERATURE: 97.7 F | HEART RATE: 63 BPM

## 2024-03-08 DIAGNOSIS — Z00.00 ENCOUNTER FOR GENERAL ADULT MEDICAL EXAMINATION W/OUT ABNORMAL FINDINGS: ICD-10-CM

## 2024-03-08 DIAGNOSIS — Z80.0 FAMILY HISTORY OF MALIGNANT NEOPLASM OF DIGESTIVE ORGANS: ICD-10-CM

## 2024-03-08 LAB
25(OH)D3 SERPL-MCNC: 44.1 NG/ML
ALBUMIN SERPL ELPH-MCNC: 4.7 G/DL
ALP BLD-CCNC: 66 U/L
ALT SERPL-CCNC: 12 U/L
ANION GAP SERPL CALC-SCNC: 14 MMOL/L
AST SERPL-CCNC: 11 U/L
BILIRUB SERPL-MCNC: 0.4 MG/DL
BUN SERPL-MCNC: 11 MG/DL
CALCIUM SERPL-MCNC: 9.8 MG/DL
CHLORIDE SERPL-SCNC: 102 MMOL/L
CHOLEST SERPL-MCNC: 177 MG/DL
CO2 SERPL-SCNC: 24 MMOL/L
CREAT SERPL-MCNC: 0.81 MG/DL
EGFR: 86 ML/MIN/1.73M2
GLUCOSE SERPL-MCNC: 80 MG/DL
HCT VFR BLD CALC: 40.3 %
HDLC SERPL-MCNC: 60 MG/DL
HGB BLD-MCNC: 13.5 G/DL
LDLC SERPL CALC-MCNC: 95 MG/DL
MCHC RBC-ENTMCNC: 28.6 PG
MCHC RBC-ENTMCNC: 33.5 GM/DL
MCV RBC AUTO: 85.4 FL
NONHDLC SERPL-MCNC: 117 MG/DL
PLATELET # BLD AUTO: 239 K/UL
POTASSIUM SERPL-SCNC: 4.5 MMOL/L
PROT SERPL-MCNC: 7.4 G/DL
RBC # BLD: 4.72 M/UL
RBC # FLD: 12.5 %
SODIUM SERPL-SCNC: 139 MMOL/L
TRIGL SERPL-MCNC: 125 MG/DL
TSH SERPL-ACNC: 3.3 UIU/ML
WBC # FLD AUTO: 3.73 K/UL

## 2024-03-08 PROCEDURE — 99396 PREV VISIT EST AGE 40-64: CPT

## 2024-03-08 NOTE — HEALTH RISK ASSESSMENT
[PHQ-2 Negative - No further assessment needed] : PHQ-2 Negative - No further assessment needed [0] : 2) Feeling down, depressed, or hopeless: Not at all (0) [[id number]] : 0 [Never] : Never

## 2024-03-08 NOTE — PHYSICAL EXAM
[No Acute Distress] : no acute distress [Well Nourished] : well nourished [Well-Appearing] : well-appearing [Well Developed] : well developed [Normal Sclera/Conjunctiva] : normal sclera/conjunctiva [PERRL] : pupils equal round and reactive to light [EOMI] : extraocular movements intact [Normal Oropharynx] : the oropharynx was normal [Normal Outer Ear/Nose] : the outer ears and nose were normal in appearance [No JVD] : no jugular venous distention [No Lymphadenopathy] : no lymphadenopathy [Supple] : supple [Thyroid Normal, No Nodules] : the thyroid was normal and there were no nodules present [No Respiratory Distress] : no respiratory distress  [No Accessory Muscle Use] : no accessory muscle use [Normal Rate] : normal rate  [Clear to Auscultation] : lungs were clear to auscultation bilaterally [Regular Rhythm] : with a regular rhythm [Normal S1, S2] : normal S1 and S2 [No Murmur] : no murmur heard [No Carotid Bruits] : no carotid bruits [No Abdominal Bruit] : a ~M bruit was not heard ~T in the abdomen [No Varicosities] : no varicosities [Pedal Pulses Present] : the pedal pulses are present [No Edema] : there was no peripheral edema [No Palpable Aorta] : no palpable aorta [Declined Breast Exam] : declined breast exam  [No Extremity Clubbing/Cyanosis] : no extremity clubbing/cyanosis [Soft] : abdomen soft [Non Tender] : non-tender [Non-distended] : non-distended [No Masses] : no abdominal mass palpated [No HSM] : no HSM [Normal Bowel Sounds] : normal bowel sounds [Normal Posterior Cervical Nodes] : no posterior cervical lymphadenopathy [Normal Anterior Cervical Nodes] : no anterior cervical lymphadenopathy [No Spinal Tenderness] : no spinal tenderness [No CVA Tenderness] : no CVA  tenderness [Grossly Normal Strength/Tone] : grossly normal strength/tone [No Joint Swelling] : no joint swelling [No Rash] : no rash [Coordination Grossly Intact] : coordination grossly intact [No Focal Deficits] : no focal deficits [Deep Tendon Reflexes (DTR)] : deep tendon reflexes were 2+ and symmetric [Normal Gait] : normal gait [Normal Insight/Judgement] : insight and judgment were intact [Normal Affect] : the affect was normal

## 2024-03-08 NOTE — HISTORY OF PRESENT ILLNESS
[de-identified] : R knee OA saw Dr. Bustillo. had been swollen due to flat feet [place] [employer] from [employer] to perform. really helped through exercise and wide toe box and zero drop shoes. back to playing tennis.  dad had stomach cancer. she declines colo did fobi 2024 willing to do h pylori test. no gerd sx.  hpv + pap normal. did colpo which was negative. [facility] gyn.  utd derm ophtho dental  utd covid vaccine declines other vaccines  osteopenia takse medigenics ostera. did dexa last year  pap + hpv returned to gyn colpo was negative.

## 2024-03-08 NOTE — ASSESSMENT
[FreeTextEntry1] : R knee much improved through new shoes and PT dad had stomach cancer rec h pylori test  hpv + pap normal cont annual gyn fu for pap utd derm ophtho dental  utd mammo. utd fit. utd covid vaccine declines other vaccines  osteopenia takse medigenics ostera. disucssed calcium. did dexa last year cont exercise.

## 2024-03-13 LAB — H PYLORI AG STL QL: NEGATIVE

## 2024-12-11 ENCOUNTER — NON-APPOINTMENT (OUTPATIENT)
Age: 54
End: 2024-12-11

## 2025-03-10 ENCOUNTER — APPOINTMENT (OUTPATIENT)
Dept: INTERNAL MEDICINE | Facility: CLINIC | Age: 55
End: 2025-03-10
Payer: COMMERCIAL

## 2025-03-10 VITALS
HEART RATE: 51 BPM | OXYGEN SATURATION: 98 % | WEIGHT: 113 LBS | HEIGHT: 63 IN | DIASTOLIC BLOOD PRESSURE: 68 MMHG | BODY MASS INDEX: 20.02 KG/M2 | SYSTOLIC BLOOD PRESSURE: 102 MMHG | TEMPERATURE: 98.4 F

## 2025-03-10 DIAGNOSIS — M85.80 OTHER SPECIFIED DISORDERS OF BONE DENSITY AND STRUCTURE, UNSPECIFIED SITE: ICD-10-CM

## 2025-03-10 DIAGNOSIS — Z00.00 ENCOUNTER FOR GENERAL ADULT MEDICAL EXAMINATION W/OUT ABNORMAL FINDINGS: ICD-10-CM

## 2025-03-10 PROCEDURE — 36415 COLL VENOUS BLD VENIPUNCTURE: CPT

## 2025-03-10 PROCEDURE — 99396 PREV VISIT EST AGE 40-64: CPT

## 2025-03-11 LAB
25(OH)D3 SERPL-MCNC: 36.5 NG/ML
ALBUMIN SERPL ELPH-MCNC: 4.4 G/DL
ALP BLD-CCNC: 64 U/L
ALT SERPL-CCNC: 22 U/L
ANION GAP SERPL CALC-SCNC: 11 MMOL/L
AST SERPL-CCNC: 19 U/L
BILIRUB SERPL-MCNC: 0.3 MG/DL
BUN SERPL-MCNC: 14 MG/DL
CALCIUM SERPL-MCNC: 9.8 MG/DL
CHLORIDE SERPL-SCNC: 105 MMOL/L
CHOLEST SERPL-MCNC: 190 MG/DL
CO2 SERPL-SCNC: 26 MMOL/L
CREAT SERPL-MCNC: 0.78 MG/DL
EGFRCR SERPLBLD CKD-EPI 2021: 90 ML/MIN/1.73M2
GLUCOSE SERPL-MCNC: 92 MG/DL
HCT VFR BLD CALC: 36.4 %
HDLC SERPL-MCNC: 60 MG/DL
HGB BLD-MCNC: 12.4 G/DL
LDLC SERPL CALC-MCNC: 108 MG/DL
MCHC RBC-ENTMCNC: 28.6 PG
MCHC RBC-ENTMCNC: 34.1 G/DL
MCV RBC AUTO: 84.1 FL
NONHDLC SERPL-MCNC: 130 MG/DL
PLATELET # BLD AUTO: 231 K/UL
POTASSIUM SERPL-SCNC: 4.5 MMOL/L
PROT SERPL-MCNC: 7.3 G/DL
RBC # BLD: 4.33 M/UL
RBC # FLD: 13 %
SODIUM SERPL-SCNC: 143 MMOL/L
TRIGL SERPL-MCNC: 123 MG/DL
WBC # FLD AUTO: 5.46 K/UL

## 2025-03-14 ENCOUNTER — TRANSCRIPTION ENCOUNTER (OUTPATIENT)
Age: 55
End: 2025-03-14

## 2025-03-27 ENCOUNTER — TRANSCRIPTION ENCOUNTER (OUTPATIENT)
Age: 55
End: 2025-03-27

## 2025-04-02 ENCOUNTER — TRANSCRIPTION ENCOUNTER (OUTPATIENT)
Age: 55
End: 2025-04-02

## 2025-05-21 ENCOUNTER — TRANSCRIPTION ENCOUNTER (OUTPATIENT)
Age: 55
End: 2025-05-21

## 2025-07-09 ENCOUNTER — TRANSCRIPTION ENCOUNTER (OUTPATIENT)
Age: 55
End: 2025-07-09